# Patient Record
Sex: FEMALE | Race: WHITE | NOT HISPANIC OR LATINO | URBAN - METROPOLITAN AREA
[De-identification: names, ages, dates, MRNs, and addresses within clinical notes are randomized per-mention and may not be internally consistent; named-entity substitution may affect disease eponyms.]

---

## 2017-11-19 ENCOUNTER — EMERGENCY (EMERGENCY)
Facility: HOSPITAL | Age: 57
LOS: 1 days | Discharge: HOME | End: 2017-11-19
Attending: EMERGENCY MEDICINE
Payer: COMMERCIAL

## 2017-11-19 VITALS
RESPIRATION RATE: 16 BRPM | DIASTOLIC BLOOD PRESSURE: 98 MMHG | SYSTOLIC BLOOD PRESSURE: 164 MMHG | OXYGEN SATURATION: 98 % | TEMPERATURE: 98 F | HEART RATE: 68 BPM

## 2017-11-19 VITALS
SYSTOLIC BLOOD PRESSURE: 162 MMHG | TEMPERATURE: 98 F | HEART RATE: 70 BPM | DIASTOLIC BLOOD PRESSURE: 86 MMHG | WEIGHT: 134.04 LBS | RESPIRATION RATE: 18 BRPM | OXYGEN SATURATION: 100 %

## 2017-11-19 DIAGNOSIS — Z90.710 ACQUIRED ABSENCE OF BOTH CERVIX AND UTERUS: Chronic | ICD-10-CM

## 2017-11-19 PROBLEM — Z00.00 ENCOUNTER FOR PREVENTIVE HEALTH EXAMINATION: Status: ACTIVE | Noted: 2017-11-19

## 2017-11-19 LAB
ALBUMIN SERPL ELPH-MCNC: 4.7 G/DL — SIGNIFICANT CHANGE UP (ref 3.3–5)
ALP SERPL-CCNC: 70 U/L — SIGNIFICANT CHANGE UP (ref 40–120)
ALT FLD-CCNC: 19 U/L RC — SIGNIFICANT CHANGE UP (ref 10–45)
ANION GAP SERPL CALC-SCNC: 15 MMOL/L — SIGNIFICANT CHANGE UP (ref 5–17)
APPEARANCE UR: CLEAR — SIGNIFICANT CHANGE UP
AST SERPL-CCNC: 18 U/L — SIGNIFICANT CHANGE UP (ref 10–40)
BACTERIA # UR AUTO: ABNORMAL /HPF
BASE EXCESS BLDV CALC-SCNC: 4.7 MMOL/L — HIGH (ref -2–2)
BASOPHILS # BLD AUTO: 0.1 K/UL — SIGNIFICANT CHANGE UP (ref 0–0.2)
BASOPHILS NFR BLD AUTO: 0.9 % — SIGNIFICANT CHANGE UP (ref 0–2)
BILIRUB SERPL-MCNC: 0.3 MG/DL — SIGNIFICANT CHANGE UP (ref 0.2–1.2)
BILIRUB UR-MCNC: NEGATIVE — SIGNIFICANT CHANGE UP
BUN SERPL-MCNC: 13 MG/DL — SIGNIFICANT CHANGE UP (ref 7–23)
CA-I SERPL-SCNC: 1.25 MMOL/L — SIGNIFICANT CHANGE UP (ref 1.12–1.3)
CALCIUM SERPL-MCNC: 9.8 MG/DL — SIGNIFICANT CHANGE UP (ref 8.4–10.5)
CHLORIDE BLDV-SCNC: 100 MMOL/L — SIGNIFICANT CHANGE UP (ref 96–108)
CHLORIDE SERPL-SCNC: 98 MMOL/L — SIGNIFICANT CHANGE UP (ref 96–108)
CO2 BLDV-SCNC: 30 MMOL/L — SIGNIFICANT CHANGE UP (ref 22–30)
CO2 SERPL-SCNC: 25 MMOL/L — SIGNIFICANT CHANGE UP (ref 22–31)
COLOR SPEC: YELLOW — SIGNIFICANT CHANGE UP
CREAT SERPL-MCNC: 0.72 MG/DL — SIGNIFICANT CHANGE UP (ref 0.5–1.3)
DIFF PNL FLD: NEGATIVE — SIGNIFICANT CHANGE UP
EOSINOPHIL # BLD AUTO: 0.1 K/UL — SIGNIFICANT CHANGE UP (ref 0–0.5)
EOSINOPHIL NFR BLD AUTO: 1.8 % — SIGNIFICANT CHANGE UP (ref 0–6)
EPI CELLS # UR: SIGNIFICANT CHANGE UP /HPF
GAS PNL BLDV: 135 MMOL/L — LOW (ref 136–145)
GAS PNL BLDV: SIGNIFICANT CHANGE UP
GLUCOSE BLDV-MCNC: 101 MG/DL — HIGH (ref 70–99)
GLUCOSE SERPL-MCNC: 96 MG/DL — SIGNIFICANT CHANGE UP (ref 70–99)
GLUCOSE UR QL: NEGATIVE — SIGNIFICANT CHANGE UP
HCG UR QL: NEGATIVE — SIGNIFICANT CHANGE UP
HCO3 BLDV-SCNC: 29 MMOL/L — SIGNIFICANT CHANGE UP (ref 21–29)
HCT VFR BLD CALC: 39.2 % — SIGNIFICANT CHANGE UP (ref 34.5–45)
HCT VFR BLDA CALC: 41 % — SIGNIFICANT CHANGE UP (ref 39–50)
HGB BLD CALC-MCNC: 13.4 G/DL — SIGNIFICANT CHANGE UP (ref 11.5–15.5)
HGB BLD-MCNC: 13.5 G/DL — SIGNIFICANT CHANGE UP (ref 11.5–15.5)
KETONES UR-MCNC: NEGATIVE — SIGNIFICANT CHANGE UP
LACTATE BLDV-MCNC: 1.4 MMOL/L — SIGNIFICANT CHANGE UP (ref 0.7–2)
LEUKOCYTE ESTERASE UR-ACNC: NEGATIVE — SIGNIFICANT CHANGE UP
LYMPHOCYTES # BLD AUTO: 1.7 K/UL — SIGNIFICANT CHANGE UP (ref 1–3.3)
LYMPHOCYTES # BLD AUTO: 27.7 % — SIGNIFICANT CHANGE UP (ref 13–44)
MCHC RBC-ENTMCNC: 33.4 PG — SIGNIFICANT CHANGE UP (ref 27–34)
MCHC RBC-ENTMCNC: 34.4 GM/DL — SIGNIFICANT CHANGE UP (ref 32–36)
MCV RBC AUTO: 97.1 FL — SIGNIFICANT CHANGE UP (ref 80–100)
MONOCYTES # BLD AUTO: 0.6 K/UL — SIGNIFICANT CHANGE UP (ref 0–0.9)
MONOCYTES NFR BLD AUTO: 9.8 % — SIGNIFICANT CHANGE UP (ref 2–14)
NEUTROPHILS # BLD AUTO: 3.8 K/UL — SIGNIFICANT CHANGE UP (ref 1.8–7.4)
NEUTROPHILS NFR BLD AUTO: 59.8 % — SIGNIFICANT CHANGE UP (ref 43–77)
NITRITE UR-MCNC: NEGATIVE — SIGNIFICANT CHANGE UP
OTHER CELLS CSF MANUAL: 6 ML/DL — LOW (ref 18–22)
PCO2 BLDV: 42 MMHG — SIGNIFICANT CHANGE UP (ref 35–50)
PH BLDV: 7.45 — SIGNIFICANT CHANGE UP (ref 7.35–7.45)
PH UR: 8 — SIGNIFICANT CHANGE UP (ref 5–8)
PLATELET # BLD AUTO: 335 K/UL — SIGNIFICANT CHANGE UP (ref 150–400)
PO2 BLDV: 21 MMHG — LOW (ref 25–45)
POTASSIUM BLDV-SCNC: 3.7 MMOL/L — SIGNIFICANT CHANGE UP (ref 3.5–5)
POTASSIUM SERPL-MCNC: 3.8 MMOL/L — SIGNIFICANT CHANGE UP (ref 3.5–5.3)
POTASSIUM SERPL-SCNC: 3.8 MMOL/L — SIGNIFICANT CHANGE UP (ref 3.5–5.3)
PROT SERPL-MCNC: 7.3 G/DL — SIGNIFICANT CHANGE UP (ref 6–8.3)
PROT UR-MCNC: NEGATIVE — SIGNIFICANT CHANGE UP
RBC # BLD: 4.04 M/UL — SIGNIFICANT CHANGE UP (ref 3.8–5.2)
RBC # FLD: 11.8 % — SIGNIFICANT CHANGE UP (ref 10.3–14.5)
SAO2 % BLDV: 33 % — LOW (ref 67–88)
SODIUM SERPL-SCNC: 138 MMOL/L — SIGNIFICANT CHANGE UP (ref 135–145)
SP GR SPEC: 1.01 — SIGNIFICANT CHANGE UP (ref 1.01–1.02)
UROBILINOGEN FLD QL: NEGATIVE — SIGNIFICANT CHANGE UP
WBC # BLD: 6.3 K/UL — SIGNIFICANT CHANGE UP (ref 3.8–10.5)
WBC # FLD AUTO: 6.3 K/UL — SIGNIFICANT CHANGE UP (ref 3.8–10.5)

## 2017-11-19 PROCEDURE — 99285 EMERGENCY DEPT VISIT HI MDM: CPT | Mod: 25

## 2017-11-19 PROCEDURE — 96374 THER/PROPH/DIAG INJ IV PUSH: CPT | Mod: XU

## 2017-11-19 PROCEDURE — 81025 URINE PREGNANCY TEST: CPT

## 2017-11-19 PROCEDURE — 84132 ASSAY OF SERUM POTASSIUM: CPT

## 2017-11-19 PROCEDURE — 83605 ASSAY OF LACTIC ACID: CPT

## 2017-11-19 PROCEDURE — 76830 TRANSVAGINAL US NON-OB: CPT | Mod: 26

## 2017-11-19 PROCEDURE — 99284 EMERGENCY DEPT VISIT MOD MDM: CPT | Mod: 25

## 2017-11-19 PROCEDURE — 74174 CTA ABD&PLVS W/CONTRAST: CPT

## 2017-11-19 PROCEDURE — 87086 URINE CULTURE/COLONY COUNT: CPT

## 2017-11-19 PROCEDURE — 80053 COMPREHEN METABOLIC PANEL: CPT

## 2017-11-19 PROCEDURE — 76830 TRANSVAGINAL US NON-OB: CPT

## 2017-11-19 PROCEDURE — 93010 ELECTROCARDIOGRAM REPORT: CPT

## 2017-11-19 PROCEDURE — 84295 ASSAY OF SERUM SODIUM: CPT

## 2017-11-19 PROCEDURE — 74176 CT ABD & PELVIS W/O CONTRAST: CPT

## 2017-11-19 PROCEDURE — 85027 COMPLETE CBC AUTOMATED: CPT

## 2017-11-19 PROCEDURE — 74174 CTA ABD&PLVS W/CONTRAST: CPT | Mod: 26

## 2017-11-19 PROCEDURE — 81001 URINALYSIS AUTO W/SCOPE: CPT

## 2017-11-19 PROCEDURE — 93005 ELECTROCARDIOGRAM TRACING: CPT

## 2017-11-19 PROCEDURE — 82947 ASSAY GLUCOSE BLOOD QUANT: CPT

## 2017-11-19 PROCEDURE — 74176 CT ABD & PELVIS W/O CONTRAST: CPT | Mod: 26

## 2017-11-19 PROCEDURE — 82330 ASSAY OF CALCIUM: CPT

## 2017-11-19 PROCEDURE — 85014 HEMATOCRIT: CPT

## 2017-11-19 PROCEDURE — 82435 ASSAY OF BLOOD CHLORIDE: CPT

## 2017-11-19 PROCEDURE — 96375 TX/PRO/DX INJ NEW DRUG ADDON: CPT | Mod: XU

## 2017-11-19 PROCEDURE — 82803 BLOOD GASES ANY COMBINATION: CPT

## 2017-11-19 RX ORDER — KETOROLAC TROMETHAMINE 30 MG/ML
15 SYRINGE (ML) INJECTION ONCE
Qty: 0 | Refills: 0 | Status: DISCONTINUED | OUTPATIENT
Start: 2017-11-19 | End: 2017-11-19

## 2017-11-19 RX ORDER — ACETAMINOPHEN 500 MG
1000 TABLET ORAL ONCE
Qty: 0 | Refills: 0 | Status: COMPLETED | OUTPATIENT
Start: 2017-11-19 | End: 2017-11-19

## 2017-11-19 RX ORDER — MORPHINE SULFATE 50 MG/1
4 CAPSULE, EXTENDED RELEASE ORAL
Qty: 0 | Refills: 0 | Status: DISCONTINUED | OUTPATIENT
Start: 2017-11-19 | End: 2017-11-19

## 2017-11-19 RX ADMIN — Medication 1000 MILLIGRAM(S): at 14:02

## 2017-11-19 RX ADMIN — Medication 400 MILLIGRAM(S): at 13:21

## 2017-11-19 RX ADMIN — Medication 15 MILLIGRAM(S): at 14:02

## 2017-11-19 RX ADMIN — Medication 15 MILLIGRAM(S): at 13:37

## 2017-11-19 NOTE — ED PROVIDER NOTE - PMH
Anxiety Disorder    Atrial fibrillation  no AC (2017)  Fibroid, Uterus    Generalized Osteoarthritis  mild  HTN (hypertension)    Hypercholesterolemia  borderline;  diet-managed.  Innocent Heart Murmur  mild MVP  Insomnia

## 2017-11-19 NOTE — ED ADULT NURSE NOTE - PSH
History of Breast Biopsy  2008.  (right).  "benign".  History of partial hysterectomy    Normal Vaginal Delivery  1990 & 1999  S/P Appendectomy  1986  S/P Endometrial Ablation  2001  S/P Tubal Ligation  2001

## 2017-11-19 NOTE — ED PROVIDER NOTE - CARE PLAN
Principal Discharge DX:	Abdominal pain  Instructions for follow-up, activity and diet:	1. Return to ED for worsening, progressive or any other concerning symptoms   2. Follow up with your primary care doctor in 2-3days  3. Take motrin 600mg every 6 hours as needed for pain and or Take Tylenol up to 650 mg every 6 hours as needed for pain.   4. Follow up with the GI clinic [phone]

## 2017-11-19 NOTE — ED PROVIDER NOTE - PLAN OF CARE
1. Return to ED for worsening, progressive or any other concerning symptoms   2. Follow up with your primary care doctor in 2-3days  3. Take motrin 600mg every 6 hours as needed for pain and or Take Tylenol up to 650 mg every 6 hours as needed for pain.   4. Follow up with the GI clinic [phone]

## 2017-11-19 NOTE — ED PROVIDER NOTE - OBJECTIVE STATEMENT
57 year old, with right flank pain, woke her from sleep. history of stone in the past (20 years ago) doesn't remember laterality. no nausea or vomit, normal bm yesterday. no gross hematuria.     PMD: moodhe  uro: 57 year old, with right flank pain, woke her from sleep. history of stone in the past (20 years ago) doesn't remember laterality. no nausea or vomit, normal bm yesterday. no gross hematuria urine from urgent car showing microscopic hematuria. History of atrial fibrillation but not anticoagulated, only rate controlled. Has never felt this type of pain before.     PMD: moodhe  uro:

## 2017-11-19 NOTE — ED PROVIDER NOTE - PHYSICAL EXAMINATION
"[facility]  \"Hello, my name is Mary Jo Cuellar , and I am calling from the [facility].  I want to check in and see how you are doing, after leaving the hospital.  You may also receive a call from your Care Coordinator (care team), but I want to make sure you don t have any urgent needs.  I have a couple questions to review with you:     Post-Discharge Outreach                                                    Alissa Escoto is a 60 year old female     Follow-up Appointments           Adult [facility] Follow-up and recommended labs and tests         Follow up with primary care provider, ANDREI LEVI, within 7 days for hospital follow- up.                    Care Team:    Patient Care Team       Relationship Specialty Notifications Start End    Andrei Levi MD PCP - General Family Practice  8/16/17     Phone: [phone] Fax: [phone]         [place] [id number]            Transition of Care Review                                                      Did you have a surgery or procedure during your hospital visit? Yes   If yes, do you have any of the following:     Signs of infection:  NO    Pain:  Yes     Pain Scale (0-10) 1/10     Location: Blisters nd when she does salt wash    Wound/incision concerns? No- one under knee did open up and scab    Do you have all of your medications/refills?  Yes    Are you having any side effects or questions about your medication(s)? No    Do you have any new or worsening symptoms?  No    Do you have any future appointments scheduled?   Yes             Plan                                                      Thanks for your time.  Your Care Coordinator may follow-up within the next couple days.  In the meantime if you have questions, concerns or problems call your care team.        Mary Jo Cuellar    "
Jewell: A & O x 3, NAD, HEENT WNL and no facial asymmetry; lungs CTAB, heart with reg rhythm; abdomen soft with right lower quadrant tenderness on palpation, positive cva tenderness on right. extremities with no edema; skin with no rashes, neuro exam non focal with no motor or sensory deficits

## 2017-11-19 NOTE — ED PROVIDER NOTE - MEDICAL DECISION MAKING DETAILS
Jewell PGY3: right flank and right lower quadrant pain on palpation. known stone history with blood in outpatient UA this morning. Will obtain CT non con, basic labs, ua, analgesia, reevaluate.

## 2017-11-19 NOTE — ED ADULT NURSE REASSESSMENT NOTE - NS ED NURSE REASSESS COMMENT FT1
Pt resting comfortably,  at bedside.  Given additional heat packs.  Awaiting imaging results and disposition.

## 2017-11-19 NOTE — ED ADULT NURSE REASSESSMENT NOTE - NS ED NURSE REASSESS COMMENT FT1
Patient able to tolerate PO. States desire to leave. Sarath SAINZ aware of BP, states take verapamil dose at home. Discharged home to follow up with PMD and GI clinic

## 2017-11-19 NOTE — ED ADULT NURSE REASSESSMENT NOTE - NS ED NURSE REASSESS COMMENT FT1
Report given to change of shift RN.  Easy work of breathing.  Med lock intact.  Pt resting comfortably. NAD. Awaiting dispo.

## 2017-11-19 NOTE — ED ADULT NURSE NOTE - OBJECTIVE STATEMENT
58 y/o F ambulatory to ED with steady gait accompanied by  c/o L back pain and RLQ pain that started at 0400 after rolling onto side in sleep.  A&Ox4.  No dizziness.  No SOB.  Easy work of breathing, no cough.  Lungs clear and equal to auscultation. Med lock 18 L AC placed.  No chest pain, numbness/tingling or palpitations.  Skin warm dry and intact.  Abd soft round extremely tender on palpation of RLQ.  Last bowel movement yesterday, normal patient pattern.  +CVA tenderness on R and ride sided pain when testing L sided CVA tenderness.  Pt denies urinary burning, urgency, frequency or odor.  VSS. Safety maintained.  Pt given urine cup and educated about clean catch.

## 2017-11-19 NOTE — ED ADULT NURSE NOTE - PMH
Anxiety Disorder    Atrial fibrillation    Fibroid, Uterus    Generalized Osteoarthritis  mild  HTN (hypertension)    Hypercholesterolemia  borderline;  diet-managed.  Innocent Heart Murmur  mild MVP  Insomnia

## 2017-11-19 NOTE — ED ADULT NURSE REASSESSMENT NOTE - NS ED NURSE REASSESS COMMENT FT1
Pt resting, declines PRN pain medications.   at bedside.  Awaiting additional imaging.  Med lock intact.

## 2017-11-19 NOTE — ED PROVIDER NOTE - ATTENDING CONTRIBUTION TO CARE
Patient with right lower abdominal pain and history of appendicectomy and partial hysterectomy. No nausea/vomiting. Patient with normal bowel movements. Moderate to severe pain. Persistent. No fever today. + history of renal stones and similar pain to the past  point tender right lower quadrant, gu by resident without mass or adnexal tenderness to palpation and no discharge, mild to moderate distress secondary to pain, ncat, trachea midline, cooperative, alert, with capacity and insight, ctab, normal bowel sounds to hypoactive bowel sounds, non-tachycardic, non-tachypneic, no rash, no petechiae, no rebound/guarding, right cvat  will get iv, labs, ct without contrast and evaluate for nephrolithiasis, prn analgesia, fluids, ua, urine culture and reassess  no ureterolithiasis or ct, ovaries not fully visualized, labs and urine within normal limits will get ct with iv contrast to ensure not secondary to embolic pathology.

## 2017-11-19 NOTE — ED ADULT NURSE REASSESSMENT NOTE - NS ED NURSE REASSESS COMMENT FT1
Report received from Mroa SANTOS. Patient A&Ox3, neuro intact. High BP, states she did not take her verapamil this morning. Reports pain 6/10, denies pain medication. Scans negative. PO challenge. Will continue to monitor.  at bedside.

## 2017-11-20 LAB
CULTURE RESULTS: NO GROWTH — SIGNIFICANT CHANGE UP
SPECIMEN SOURCE: SIGNIFICANT CHANGE UP

## 2017-11-27 ENCOUNTER — APPOINTMENT (OUTPATIENT)
Dept: SURGERY | Facility: CLINIC | Age: 57
End: 2017-11-27
Payer: COMMERCIAL

## 2017-11-27 VITALS
WEIGHT: 134 LBS | TEMPERATURE: 98.3 F | SYSTOLIC BLOOD PRESSURE: 148 MMHG | DIASTOLIC BLOOD PRESSURE: 88 MMHG | HEART RATE: 79 BPM | HEIGHT: 64 IN | BODY MASS INDEX: 22.88 KG/M2

## 2017-11-27 DIAGNOSIS — Z78.9 OTHER SPECIFIED HEALTH STATUS: ICD-10-CM

## 2017-11-27 DIAGNOSIS — Z86.79 PERSONAL HISTORY OF OTHER DISEASES OF THE CIRCULATORY SYSTEM: ICD-10-CM

## 2017-11-27 DIAGNOSIS — Z83.3 FAMILY HISTORY OF DIABETES MELLITUS: ICD-10-CM

## 2017-11-27 DIAGNOSIS — Z86.39 PERSONAL HISTORY OF OTHER ENDOCRINE, NUTRITIONAL AND METABOLIC DISEASE: ICD-10-CM

## 2017-11-27 DIAGNOSIS — R10.9 UNSPECIFIED ABDOMINAL PAIN: ICD-10-CM

## 2017-11-27 DIAGNOSIS — Z82.49 FAMILY HISTORY OF ISCHEMIC HEART DISEASE AND OTHER DISEASES OF THE CIRCULATORY SYSTEM: ICD-10-CM

## 2017-11-27 PROCEDURE — 99243 OFF/OP CNSLTJ NEW/EST LOW 30: CPT

## 2017-11-27 RX ORDER — ROSUVASTATIN CALCIUM 5 MG/1
5 TABLET, FILM COATED ORAL
Refills: 0 | Status: ACTIVE | COMMUNITY

## 2017-11-27 RX ORDER — VENLAFAXINE HYDROCHLORIDE 37.5 MG/1
37.5 CAPSULE, EXTENDED RELEASE ORAL
Refills: 0 | Status: ACTIVE | COMMUNITY

## 2017-11-27 RX ORDER — VERAPAMIL HYDROCHLORIDE 300 MG/1
300 CAPSULE, EXTENDED RELEASE ORAL
Refills: 0 | Status: ACTIVE | COMMUNITY

## 2018-02-02 ENCOUNTER — OUTPATIENT (OUTPATIENT)
Dept: OUTPATIENT SERVICES | Facility: HOSPITAL | Age: 58
LOS: 1 days | End: 2018-02-02
Payer: COMMERCIAL

## 2018-02-02 VITALS
DIASTOLIC BLOOD PRESSURE: 84 MMHG | TEMPERATURE: 98 F | SYSTOLIC BLOOD PRESSURE: 130 MMHG | HEART RATE: 84 BPM | WEIGHT: 132.06 LBS | HEIGHT: 64.25 IN | RESPIRATION RATE: 14 BRPM

## 2018-02-02 DIAGNOSIS — M67.441 GANGLION, RIGHT HAND: ICD-10-CM

## 2018-02-02 DIAGNOSIS — Z98.890 OTHER SPECIFIED POSTPROCEDURAL STATES: Chronic | ICD-10-CM

## 2018-02-02 DIAGNOSIS — Z90.710 ACQUIRED ABSENCE OF BOTH CERVIX AND UTERUS: Chronic | ICD-10-CM

## 2018-02-02 DIAGNOSIS — M65.30 TRIGGER FINGER, UNSPECIFIED FINGER: ICD-10-CM

## 2018-02-02 LAB
BUN SERPL-MCNC: 13 MG/DL — SIGNIFICANT CHANGE UP (ref 7–23)
CALCIUM SERPL-MCNC: 9.5 MG/DL — SIGNIFICANT CHANGE UP (ref 8.4–10.5)
CHLORIDE SERPL-SCNC: 101 MMOL/L — SIGNIFICANT CHANGE UP (ref 98–107)
CO2 SERPL-SCNC: 30 MMOL/L — SIGNIFICANT CHANGE UP (ref 22–31)
CREAT SERPL-MCNC: 0.69 MG/DL — SIGNIFICANT CHANGE UP (ref 0.5–1.3)
GLUCOSE SERPL-MCNC: 85 MG/DL — SIGNIFICANT CHANGE UP (ref 70–99)
HBA1C BLD-MCNC: 5.4 % — SIGNIFICANT CHANGE UP (ref 4–5.6)
HCT VFR BLD CALC: 38.7 % — SIGNIFICANT CHANGE UP (ref 34.5–45)
HGB BLD-MCNC: 13.1 G/DL — SIGNIFICANT CHANGE UP (ref 11.5–15.5)
MCHC RBC-ENTMCNC: 32.3 PG — SIGNIFICANT CHANGE UP (ref 27–34)
MCHC RBC-ENTMCNC: 33.9 % — SIGNIFICANT CHANGE UP (ref 32–36)
MCV RBC AUTO: 95.3 FL — SIGNIFICANT CHANGE UP (ref 80–100)
NRBC # FLD: 0 — SIGNIFICANT CHANGE UP
PLATELET # BLD AUTO: 301 K/UL — SIGNIFICANT CHANGE UP (ref 150–400)
PMV BLD: 9.4 FL — SIGNIFICANT CHANGE UP (ref 7–13)
POTASSIUM SERPL-MCNC: 4.3 MMOL/L — SIGNIFICANT CHANGE UP (ref 3.5–5.3)
POTASSIUM SERPL-SCNC: 4.3 MMOL/L — SIGNIFICANT CHANGE UP (ref 3.5–5.3)
RBC # BLD: 4.06 M/UL — SIGNIFICANT CHANGE UP (ref 3.8–5.2)
RBC # FLD: 12.7 % — SIGNIFICANT CHANGE UP (ref 10.3–14.5)
SODIUM SERPL-SCNC: 141 MMOL/L — SIGNIFICANT CHANGE UP (ref 135–145)
WBC # BLD: 4.41 K/UL — SIGNIFICANT CHANGE UP (ref 3.8–10.5)
WBC # FLD AUTO: 4.41 K/UL — SIGNIFICANT CHANGE UP (ref 3.8–10.5)

## 2018-02-02 PROCEDURE — 93010 ELECTROCARDIOGRAM REPORT: CPT

## 2018-02-02 RX ORDER — VERAPAMIL HCL 240 MG
0 CAPSULE, EXTENDED RELEASE PELLETS 24 HR ORAL
Qty: 0 | Refills: 0 | COMMUNITY

## 2018-02-02 RX ORDER — ROSUVASTATIN CALCIUM 5 MG/1
0 TABLET ORAL
Qty: 0 | Refills: 0 | COMMUNITY

## 2018-02-02 RX ORDER — VENLAFAXINE HCL 75 MG
0 CAPSULE, EXT RELEASE 24 HR ORAL
Qty: 0 | Refills: 0 | COMMUNITY

## 2018-02-02 NOTE — H&P PST ADULT - PMH
Anxiety Disorder  pt. denies anxiety, on Venlafaxine for "hot flashes"  Atrial fibrillation  no AC (2017), "they're treating me with Verapamil because it happens when my BP goes high"  Fibroid, Uterus    Generalized Osteoarthritis  mild  HTN (hypertension)    Hyperlipidemia    Innocent Heart Murmur  mild MVP  Insomnia Anxiety Disorder  pt. denies anxiety, on Venlafaxine for "hot flashes"  Atrial fibrillation  no AC (2017), "they're treating me with Verapamil because it happens when my BP goes high"  Carpal tunnel syndrome, bilateral    Fibroid, Uterus    Generalized Osteoarthritis  mild  HTN (hypertension)    Hyperlipidemia    Innocent Heart Murmur  mild MVP  Insomnia    Trigger finger of right hand  middle and ring

## 2018-02-02 NOTE — H&P PST ADULT - REASON FOR ADMISSION
"I have 2 trigger fingers, the right hand, the 2 middle fingers, and I've been suffering with carpal tunnel for so long that he's gonna do both"

## 2018-02-02 NOTE — H&P PST ADULT - FAMILY HISTORY
Father  Still living? No  Chronic coronary artery disease, Age at diagnosis: Age Unknown  Family history of cancer, Age at diagnosis: Age Unknown  Type 2 diabetes mellitus, Age at diagnosis: Age Unknown     Aunt  Still living? No  Type 2 diabetes mellitus, Age at diagnosis: Age Unknown     Sibling  Still living? Yes, Estimated age: 60  Family history of hyperlipidemia, Age at diagnosis: Age Unknown     Sibling  Still living? Yes, Estimated age: 58  Family history of hyperlipidemia, Age at diagnosis: Age Unknown     Mother  Still living? No  Family history of stroke, Age at diagnosis: Age Unknown

## 2018-02-02 NOTE — H&P PST ADULT - PSH
H/O needle biopsy  DEVONTE breast before 2008  H/O right breast biopsy  2008  History of partial hysterectomy  2009  Normal Vaginal Delivery  1990 & 1999  S/P Appendectomy  1986  S/P Endometrial Ablation  2001  S/P Tubal Ligation  2001 H/O needle biopsy  DEVONTE breast before 2008  H/O right breast biopsy  2008  History of partial hysterectomy  2009  Normal Vaginal Delivery  1990 & 1999  S/P Appendectomy  1986  S/P Endometrial Ablation  2001  S/P Tubal Ligation  2001  Status post embolization of uterine artery  2001

## 2018-02-02 NOTE — H&P PST ADULT - ANESTHESIA, PREVIOUS REACTION, PROFILE
"I get violently ill, horrible vomiting"; "my sister also", "the only time I've never gotten sick is when I go for a colonoscopy"

## 2018-02-02 NOTE — H&P PST ADULT - NSANTHOSAYNRD_GEN_A_CORE
No. LUIZA screening performed.  STOP BANG Legend: 0-2 = LOW Risk; 3-4 = INTERMEDIATE Risk; 5-8 = HIGH Risk

## 2018-02-02 NOTE — H&P PST ADULT - HISTORY OF PRESENT ILLNESS
Pt. is a 58 yo female with trigger fingers and right carpal tunnel.  Several years ago pt. had a steroid injection for the carpal tunnel.  Last year she had 2 Cortisone injections for the trigger fingers,

## 2018-02-02 NOTE — H&P PST ADULT - NS PRO PASSIVE SMOKE EXP
Notified MD (Dr. Avendaño) of pt c/o of left eye discomfort. Left eye seems to be blood red. Heparin stopped. MD will be down shortly.   Yes...

## 2018-02-03 ENCOUNTER — TRANSCRIPTION ENCOUNTER (OUTPATIENT)
Age: 58
End: 2018-02-03

## 2018-02-05 ENCOUNTER — OUTPATIENT (OUTPATIENT)
Dept: OUTPATIENT SERVICES | Facility: HOSPITAL | Age: 58
LOS: 1 days | Discharge: HOME | End: 2018-02-05

## 2018-02-05 ENCOUNTER — TRANSCRIPTION ENCOUNTER (OUTPATIENT)
Age: 58
End: 2018-02-05

## 2018-02-05 VITALS
OXYGEN SATURATION: 100 % | HEIGHT: 64.25 IN | HEART RATE: 73 BPM | SYSTOLIC BLOOD PRESSURE: 139 MMHG | WEIGHT: 132.06 LBS | RESPIRATION RATE: 20 BRPM | TEMPERATURE: 98 F | DIASTOLIC BLOOD PRESSURE: 80 MMHG

## 2018-02-05 VITALS — TEMPERATURE: 98 F

## 2018-02-05 DIAGNOSIS — Z98.890 OTHER SPECIFIED POSTPROCEDURAL STATES: Chronic | ICD-10-CM

## 2018-02-05 DIAGNOSIS — Z90.710 ACQUIRED ABSENCE OF BOTH CERVIX AND UTERUS: Chronic | ICD-10-CM

## 2018-02-05 DIAGNOSIS — M67.441 GANGLION, RIGHT HAND: ICD-10-CM

## 2018-02-05 NOTE — ASU DISCHARGE PLAN (ADULT/PEDIATRIC). - NOTIFY
Bleeding that does not stop/Pain not relieved by Medications/Unable to Urinate/Persistent Nausea and Vomiting/Fever greater than 101/Swelling that continues/Numbness, color, or temperature change to extremity/Inability to Tolerate Liquids or Foods

## 2018-02-05 NOTE — ASU DISCHARGE PLAN (ADULT/PEDIATRIC). - SPECIAL INSTRUCTIONS
Follow MD sheet   Ice 20 minutes on/ 20 minutes off   Don't take Tylenol with percocet as there is Tylenol in percocet   Can take Motrin for breakthrough pain   Take stool softener as needed for possible constipation from pain meds

## 2018-02-05 NOTE — ASU DISCHARGE PLAN (ADULT/PEDIATRIC). - PAIN
prescription called to pharmacy May take Tylenol 650mg orally every 3 hours as needed if not taking percocet/prescription called to pharmacy

## 2018-07-16 PROBLEM — I48.91 UNSPECIFIED ATRIAL FIBRILLATION: Chronic | Status: ACTIVE | Noted: 2017-11-19

## 2019-03-12 NOTE — ED ADULT NURSE NOTE - DISCHARGE DATE/TIME
----- Message from Marianne Wilkerson sent at 3/12/2019 11:47 AM CDT -----  Contact: ohh  Type: Home Health (orders, updates, clarifications, etc.)    Home Health Agency/Nurse: [facility]     Phone number: [phone]    Reason for call: discuss patient result of evaluation      Comments: please call and advise, Thanks       19-Nov-2017 20:14

## 2020-07-03 ENCOUNTER — TRANSCRIPTION ENCOUNTER (OUTPATIENT)
Age: 60
End: 2020-07-03

## 2020-07-28 ENCOUNTER — TRANSCRIPTION ENCOUNTER (OUTPATIENT)
Age: 60
End: 2020-07-28

## 2021-05-12 PROBLEM — E78.5 HYPERLIPIDEMIA, UNSPECIFIED: Chronic | Status: ACTIVE | Noted: 2018-02-02

## 2021-05-12 PROBLEM — G56.03 CARPAL TUNNEL SYNDROME, BILATERAL UPPER LIMBS: Chronic | Status: ACTIVE | Noted: 2018-02-02

## 2021-05-12 PROBLEM — I10 ESSENTIAL (PRIMARY) HYPERTENSION: Chronic | Status: ACTIVE | Noted: 2017-11-19

## 2021-05-12 PROBLEM — M65.30 TRIGGER FINGER, UNSPECIFIED FINGER: Chronic | Status: ACTIVE | Noted: 2018-02-02

## 2021-05-20 ENCOUNTER — OUTPATIENT (OUTPATIENT)
Dept: OUTPATIENT SERVICES | Facility: HOSPITAL | Age: 61
LOS: 1 days | End: 2021-05-20
Payer: COMMERCIAL

## 2021-05-20 DIAGNOSIS — Z98.890 OTHER SPECIFIED POSTPROCEDURAL STATES: Chronic | ICD-10-CM

## 2021-05-20 DIAGNOSIS — Z90.710 ACQUIRED ABSENCE OF BOTH CERVIX AND UTERUS: Chronic | ICD-10-CM

## 2021-05-20 PROCEDURE — 93010 ELECTROCARDIOGRAM REPORT: CPT

## 2021-06-01 ENCOUNTER — APPOINTMENT (OUTPATIENT)
Dept: DISASTER EMERGENCY | Facility: CLINIC | Age: 61
End: 2021-06-01

## 2021-06-03 ENCOUNTER — OUTPATIENT (OUTPATIENT)
Dept: OUTPATIENT SERVICES | Facility: HOSPITAL | Age: 61
LOS: 1 days | End: 2021-06-03

## 2021-06-03 DIAGNOSIS — Z98.890 OTHER SPECIFIED POSTPROCEDURAL STATES: Chronic | ICD-10-CM

## 2021-06-03 DIAGNOSIS — Z90.710 ACQUIRED ABSENCE OF BOTH CERVIX AND UTERUS: Chronic | ICD-10-CM

## 2021-06-24 ENCOUNTER — TRANSCRIPTION ENCOUNTER (OUTPATIENT)
Age: 61
End: 2021-06-24

## 2021-09-27 NOTE — ED ADULT TRIAGE NOTE - NS ED NURSE DIRECT TO ROOM YN
Post-Op Assessment Note    CV Status:  Stable  Pain Score: 0    Pain management: adequate     Mental Status:  Alert and awake   Hydration Status:  Euvolemic   PONV Controlled:  Controlled   Airway Patency:  Patent      Post Op Vitals Reviewed: Yes      Staff: Anesthesiologist, CRNA         No complications documented      BP   101/52   Temp      Pulse  77   Resp   16   SpO2   98 No

## 2022-03-19 ENCOUNTER — TRANSCRIPTION ENCOUNTER (OUTPATIENT)
Age: 62
End: 2022-03-19

## 2022-05-26 ENCOUNTER — NON-APPOINTMENT (OUTPATIENT)
Age: 62
End: 2022-05-26

## 2022-10-24 ENCOUNTER — NON-APPOINTMENT (OUTPATIENT)
Age: 62
End: 2022-10-24

## 2023-06-09 ENCOUNTER — NON-APPOINTMENT (OUTPATIENT)
Age: 63
End: 2023-06-09

## 2023-06-27 ENCOUNTER — TRANSCRIPTION ENCOUNTER (OUTPATIENT)
Age: 63
End: 2023-06-27

## 2024-06-09 ENCOUNTER — NON-APPOINTMENT (OUTPATIENT)
Age: 64
End: 2024-06-09

## 2024-12-18 ENCOUNTER — APPOINTMENT (OUTPATIENT)
Dept: ORTHOPEDIC SURGERY | Facility: CLINIC | Age: 64
End: 2024-12-18
Payer: COMMERCIAL

## 2024-12-18 ENCOUNTER — NON-APPOINTMENT (OUTPATIENT)
Age: 64
End: 2024-12-18

## 2024-12-18 VITALS — BODY MASS INDEX: 23.56 KG/M2 | HEIGHT: 64 IN | WEIGHT: 138 LBS

## 2024-12-18 DIAGNOSIS — M22.41 CHONDROMALACIA PATELLAE, RIGHT KNEE: ICD-10-CM

## 2024-12-18 DIAGNOSIS — M22.42 CHONDROMALACIA PATELLAE, LEFT KNEE: ICD-10-CM

## 2024-12-18 PROCEDURE — 73564 X-RAY EXAM KNEE 4 OR MORE: CPT | Mod: LT

## 2024-12-18 PROCEDURE — 99203 OFFICE O/P NEW LOW 30 MIN: CPT

## 2025-02-05 ENCOUNTER — NON-APPOINTMENT (OUTPATIENT)
Age: 65
End: 2025-02-05

## 2025-02-21 ENCOUNTER — NON-APPOINTMENT (OUTPATIENT)
Age: 65
End: 2025-02-21

## 2025-02-21 ENCOUNTER — APPOINTMENT (OUTPATIENT)
Dept: RADIOLOGY | Facility: CLINIC | Age: 65
End: 2025-02-21
Payer: COMMERCIAL

## 2025-02-21 PROCEDURE — 71046 X-RAY EXAM CHEST 2 VIEWS: CPT

## 2025-03-25 ENCOUNTER — APPOINTMENT (OUTPATIENT)
Dept: RADIOLOGY | Facility: CLINIC | Age: 65
End: 2025-03-25
Payer: COMMERCIAL

## 2025-03-25 ENCOUNTER — APPOINTMENT (OUTPATIENT)
Dept: CT IMAGING | Facility: CLINIC | Age: 65
End: 2025-03-25

## 2025-03-25 PROCEDURE — 71250 CT THORAX DX C-: CPT

## 2025-03-25 PROCEDURE — 71100 X-RAY EXAM RIBS UNI 2 VIEWS: CPT | Mod: RT

## 2025-07-10 ENCOUNTER — APPOINTMENT (OUTPATIENT)
Dept: ORTHOPEDIC SURGERY | Facility: CLINIC | Age: 65
End: 2025-07-10
Payer: COMMERCIAL

## 2025-07-10 PROBLEM — M19.011 OSTEOARTHRITIS OF RIGHT ACROMIOCLAVICULAR JOINT: Status: ACTIVE | Noted: 2025-07-10

## 2025-07-10 PROBLEM — M75.81 TENDINITIS OF RIGHT ROTATOR CUFF: Status: ACTIVE | Noted: 2025-07-10

## 2025-07-10 PROCEDURE — 99204 OFFICE O/P NEW MOD 45 MIN: CPT

## 2025-07-10 PROCEDURE — 73030 X-RAY EXAM OF SHOULDER: CPT | Mod: RT

## 2025-07-11 ENCOUNTER — APPOINTMENT (OUTPATIENT)
Dept: ORTHOPEDIC SURGERY | Facility: CLINIC | Age: 65
End: 2025-07-11
Payer: COMMERCIAL

## 2025-07-11 VITALS — BODY MASS INDEX: 22.53 KG/M2 | HEIGHT: 64 IN | WEIGHT: 132 LBS

## 2025-07-11 PROBLEM — Z86.79 HISTORY OF SUPRAVENTRICULAR TACHYCARDIA: Status: RESOLVED | Noted: 2025-07-11 | Resolved: 2025-07-11

## 2025-07-11 PROBLEM — G47.30 SLEEP APNEA TREATED WITH CONTINUOUS POSITIVE AIRWAY PRESSURE (CPAP): Status: RESOLVED | Noted: 2025-07-11 | Resolved: 2025-07-11

## 2025-07-11 PROBLEM — Z87.891 FORMER SMOKER: Status: ACTIVE | Noted: 2025-07-11

## 2025-07-11 PROBLEM — M25.561 ACUTE PAIN OF BOTH KNEES: Status: ACTIVE | Noted: 2025-07-11

## 2025-07-11 PROCEDURE — 73560 X-RAY EXAM OF KNEE 1 OR 2: CPT | Mod: 50,59

## 2025-07-11 PROCEDURE — 99204 OFFICE O/P NEW MOD 45 MIN: CPT

## 2025-07-11 RX ORDER — METOPROLOL SUCCINATE 50 MG/1
50 TABLET, EXTENDED RELEASE ORAL
Refills: 0 | Status: ACTIVE | COMMUNITY

## 2025-07-11 RX ORDER — AMLODIPINE BESYLATE 5 MG/1
5 TABLET ORAL
Refills: 0 | Status: ACTIVE | COMMUNITY

## 2025-07-11 RX ORDER — LOSARTAN POTASSIUM 100 MG/1
100 TABLET, FILM COATED ORAL
Refills: 0 | Status: ACTIVE | COMMUNITY

## 2025-07-11 RX ORDER — CELECOXIB 200 MG/1
200 CAPSULE ORAL
Refills: 0 | Status: ACTIVE | COMMUNITY

## 2025-07-11 RX ORDER — HYDROXYCHLOROQUINE SULFATE 200 MG/1
200 TABLET, FILM COATED ORAL
Refills: 0 | Status: ACTIVE | COMMUNITY

## 2025-07-22 ENCOUNTER — APPOINTMENT (OUTPATIENT)
Dept: ULTRASOUND IMAGING | Facility: CLINIC | Age: 65
End: 2025-07-22
Payer: COMMERCIAL

## 2025-07-22 ENCOUNTER — APPOINTMENT (OUTPATIENT)
Dept: MAMMOGRAPHY | Facility: CLINIC | Age: 65
End: 2025-07-22
Payer: COMMERCIAL

## 2025-07-22 PROCEDURE — 76641 ULTRASOUND BREAST COMPLETE: CPT | Mod: 50

## 2025-07-22 PROCEDURE — 77067 SCR MAMMO BI INCL CAD: CPT

## 2025-07-22 PROCEDURE — 77063 BREAST TOMOSYNTHESIS BI: CPT

## 2025-08-21 ENCOUNTER — APPOINTMENT (OUTPATIENT)
Dept: ORTHOPEDIC SURGERY | Facility: CLINIC | Age: 65
End: 2025-08-21